# Patient Record
Sex: MALE | Race: WHITE | Employment: UNEMPLOYED | URBAN - NONMETROPOLITAN AREA
[De-identification: names, ages, dates, MRNs, and addresses within clinical notes are randomized per-mention and may not be internally consistent; named-entity substitution may affect disease eponyms.]

---
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
## 2020-11-16 ENCOUNTER — HEALTH MAINTENANCE LETTER (OUTPATIENT)
Age: 20
End: 2020-11-16

## 2021-06-20 ENCOUNTER — NURSE TRIAGE (OUTPATIENT)
Dept: NURSING | Facility: CLINIC | Age: 21
End: 2021-06-20

## 2021-06-21 NOTE — TELEPHONE ENCOUNTER
Ronnell calls and says that he has an ingrown toenail on his right big toe. Pt. Says that the skin is inflamed and is over the nail. Pt. Says that he has pus from the nail. COVID 19 Nurse Triage Plan/Patient Instructions    Please be aware that novel coronavirus (COVID-19) may be circulating in the community. If you develop symptoms such as fever, cough, or SOB or if you have concerns about the presence of another infection including coronavirus (COVID-19), please contact your health care provider or visit [email or web address].     Disposition/Instructions    In-Person Visit with provider recommended. Reference Visit Selection Guide.    Thank you for taking steps to prevent the spread of this virus.  o Limit your contact with others.  o Wear a simple mask to cover your cough.  o Wash your hands well and often.    Resources    [facility]: About COVID-19: [email or web address]    CDC: What to Do If You're Sick: www.cdc.gov/coronavirus/2019-ncov/about/steps-when-sick.html    CDC: Ending Home Isolation: www.cdc.gov/coronavirus/2019-ncov/hcp/disposition-in-home-patients.html     CDC: Caring for Someone: www.cdc.gov/coronavirus/2019-ncov/if-you-are-sick/care-for-someone.html     [facility]: Interim Guidance for Hospital Discharge to Home: www.health.[facility].[place]/diseases/coronavirus/hcp/hospdischarge.pdf    [facility] clinical trials (COVID-19 research studies): [email or web address]     Below are the COVID-19 hotlines at the [place] [facility] ([facility]). Interpreters are available.   o For health questions: Call 563-676-7576 or 1-243.567.3624 (7 a.m. to 7 p.m.)  o For questions about schools and childcare: Call 596-029-9880 or 1-301.450.3603 (7 a.m. to 7 p.m.)                   Reason for Disposition    [1] Spreading redness or small red streak AND [2] no fever    Additional Information    Negative: Taking antibiotics for ingrown toenail infection    Negative:  Child sounds very sick or weak to the triager    Negative: [1] Looks infected (e.g., spreading redness, red streak, pus) AND [2] fever    Negative: [1] Red streaking AND [2] larger than 1 inch (2.5 cm)    Negative: Entire toe is red    Negative: [1] SEVERE pain (excruciating) AND [2] not improved 2 hours after pain medicine and antibiotic ointment    Protocols used: TOENAIL - INGROWN-SHIVAM-AH

## 2021-09-18 ENCOUNTER — HEALTH MAINTENANCE LETTER (OUTPATIENT)
Age: 21
End: 2021-09-18

## 2022-01-08 ENCOUNTER — HEALTH MAINTENANCE LETTER (OUTPATIENT)
Age: 22
End: 2022-01-08

## 2022-11-20 ENCOUNTER — HEALTH MAINTENANCE LETTER (OUTPATIENT)
Age: 22
End: 2022-11-20

## 2022-12-25 NOTE — TELEPHONE ENCOUNTER
Good morning!    My question actually pertains to my son, Ronnell.  He tried to contact you through his own [employer] account, but was having problems logging in.    It appears that his rash has worsened since starting the medication.  He had a couple of small patches last night, but it seemed considerably worse again this morning.  He describes the area as feeling tight and burning.  Is this to be expected?    Thank you,  Ellie   25-Dec-2022 22:49

## 2023-04-15 ENCOUNTER — HEALTH MAINTENANCE LETTER (OUTPATIENT)
Age: 23
End: 2023-04-15

## 2024-06-16 ENCOUNTER — HEALTH MAINTENANCE LETTER (OUTPATIENT)
Age: 24
End: 2024-06-16

## 2024-08-05 ENCOUNTER — VIRTUAL VISIT (OUTPATIENT)
Dept: FAMILY MEDICINE | Facility: CLINIC | Age: 24
End: 2024-08-05
Payer: COMMERCIAL

## 2024-08-05 DIAGNOSIS — R22.0 FACIAL SWELLING: Primary | ICD-10-CM

## 2024-08-05 PROCEDURE — 99213 OFFICE O/P EST LOW 20 MIN: CPT | Mod: 95 | Performed by: FAMILY MEDICINE

## 2024-08-05 PROCEDURE — G2211 COMPLEX E/M VISIT ADD ON: HCPCS | Mod: 95 | Performed by: FAMILY MEDICINE

## 2024-08-05 RX ORDER — PREDNISONE 20 MG/1
40 TABLET ORAL DAILY
Qty: 10 TABLET | Refills: 0 | Status: SHIPPED | OUTPATIENT
Start: 2024-08-05 | End: 2024-08-10

## 2024-08-05 NOTE — PROGRESS NOTES
Ronnell is a 23 year old who is being evaluated via a billable video visit.      Assessment & Plan   1. Facial swelling  Parent localized swelling likely related to insect bite.  Will treat with prednisone as below.  No signs of cellulitis.  No signs of symptoms outside of the skin making anaphylaxis less likely.  Discussed red flag symptoms when to go to emergency department such as wheezing, lightheadedness, GI upset etc.  Continue using antihistamines and topical cold.  If not improving quickly with the steroid as outlined below recommended short-term follow-up.  Patient agreeable with plan  - predniSONE (DELTASONE) 20 MG tablet; Take 2 tablets (40 mg) by mouth daily for 5 days  Dispense: 10 tablet; Refill: 0    Moisés Rivers MD  [facility]    Disclaimer: This note consists of symbols derived from keyboarding, dictation and/or voice recognition software. As a result, there may be errors in the script that have gone undetected. Please consider this when interpreting information found in this chart.    Subjective   Ronnell is a 23 year old, presenting for the following health issues:  No chief complaint on file.    HPI     Patient reports being stung by an unknown insect yesterday on his right ear.  The ear swelled up and is now spreading onto the right side of the face including the eye and right cheek.  Does not cross midline.  Denies any difficulty swallowing, shortness of breath, wheezing, lightheadedness, stomach upset.  Has not had this happen before.  He has tried Benadryl and Zyrtec without improvement.  He is also tried an ice pack.    He has no other questions or concerns today.  He denies any redness.    Review of Systems  Constitutional, HEENT, cardiovascular, pulmonary, GI, , musculoskeletal, neuro, skin, endocrine and psych systems are negative, except as otherwise noted.      Objective         Vitals:  No vitals were obtained today due to virtual visit.    Physical  Exam   GENERAL: alert and no distress  EYES: Eyes grossly normal to inspection.  No discharge or erythema, or obvious scleral/conjunctival abnormalities.  RESP: No audible wheeze, cough, or visible cyanosis.    SKIN: Visible skin clear. No significant rash, abnormal pigmentation or lesions.  NEURO: Cranial nerves grossly intact.  Mentation and speech appropriate for age.  PSYCH: Appropriate affect, tone, and pace of words    Labs: None      Video-Visit Details    Type of service:  Video Visit   Originating Location (pt. Location): Home    Distant Location (provider location):  On-site  Platform used for Video Visit: [employer]

## 2025-06-21 ENCOUNTER — HEALTH MAINTENANCE LETTER (OUTPATIENT)
Age: 25
End: 2025-06-21